# Patient Record
Sex: FEMALE | Race: BLACK OR AFRICAN AMERICAN | Employment: FULL TIME | URBAN - METROPOLITAN AREA
[De-identification: names, ages, dates, MRNs, and addresses within clinical notes are randomized per-mention and may not be internally consistent; named-entity substitution may affect disease eponyms.]

---

## 2020-10-19 ENCOUNTER — HOSPITAL ENCOUNTER (OUTPATIENT)
Dept: PHYSICAL THERAPY | Age: 60
Discharge: HOME | End: 2020-10-19
Payer: COMMERCIAL

## 2020-10-19 PROCEDURE — 97110 THERAPEUTIC EXERCISES: CPT

## 2020-10-19 PROCEDURE — 97162 PT EVAL MOD COMPLEX 30 MIN: CPT

## 2020-10-19 NOTE — PROGRESS NOTES
[facility] PHYSICAL THERAPY    [address] [place] [address] [address],[facility], 70 [facility]       Phone: [phone]  Fax: [phone] / 3[facility]  Patient Name: Lisa Braxton : 1960   Medical   Diagnosis: R hip pain Treatment Diagnosis: Right hip pain [M25.551]   Onset Date: Chronic     Referral Source: Shawn Linda MD Start of Care [facility]): 10/19/2020   Prior Hospitalization: See medical history Provider #: [id number]   Prior Level of Function: Chronic history of difficulty with prolonged walking, standing activities   Comorbidities: Arthritis, HTN, Asthma, Hx of Breast cancer (2018)   Medications: Verified on Patient Summary List   The Plan of Care and following information is based on the information from the initial evaluation.   ===========================================================================================  Assessment / key information:  Patient is a 63year old female presenting to therapy with signs and symptoms consistent with R hip pain. Patient states her symptoms began several months ago and progressively worsened. Patient states she recently was seen by an ortho MD and received x-rays on her R hip which was positive for arthritis. Patient has also been experiencing L sided lower back and leg pain which she thinks may be related to the way she is walking. Patient reports increased difficulty with prolonged walking, standing, bending and lifting activities. Patient notes symptoms feel better with rest. Patient rates pain at worst 8/10 and 7/10 at best.   Objective Data: Inspection-Patient ambulates with reduced stance time on the R, trunk lean to the R, forward trunk posture. Hip AROM: flex R 75 (P!), L 85 . Strength Testing: Hip flex R 4-/5, L 4/5; ext R 3+/5, L 3+/5; abd R 3+/5, L 4/5; Knee Flex R 4/5, L 5/5; ext R 4/5, L 5/5.  Flexibility Testing: moderate restriction to R piriformis. Tenderness to R RF, R TFL, R glute med. FOTO: unavailable at this time. Patient educated on diagnosis, prognosis, POC and HEP. Patient issued copy of HEP and denied additional questions. Patient will benefit from skilled PT in order to address these impairments and functional limitations.   ===========================================================================================  Eval Complexity: History HIGH Complexity :3+ comorbidities / personal factors will impact the outcome/ POC ;  Examination  HIGH Complexity : 4+ Standardized tests and measures addressing body structure, function, activity limitation and / or participation in recreation ; Presentation MEDIUM Complexity : Evolving with changing characteristics ; Decision Making Other outcome measures clinical judgement  MEDIUM; Overall Complexity MEDIUM  Problem List: pain affecting function, decrease ROM, decrease strength, impaired gait/ balance, decrease ADL/ functional abilitiies, decrease activity tolerance, decrease flexibility/ joint mobility and decrease transfer abilities   Treatment Plan may include any combination of the following: Therapeutic exercise, Therapeutic activities, Neuromuscular re-education, Physical agent/modality, Gait/balance training, Manual therapy, Patient education and Functional mobility training  Patient / Family readiness to learn indicated by: asking questions, trying to perform skills and interest  Persons(s) to be included in education: patient (P)  Barriers to Learning/Limitations: no  Measures taken:    Patient Goal (s): Pain reilef   Patient self reported health status: good  Rehabilitation Potential: good   Short Term Goals: To be accomplished in  3  weeks:  1. Patient will demonstrate independence with HEP for self management of symptoms. 2. Patient will report reduction in pain at worst to 4-5/10 in order to improve tolerance to standing activities.  Long Term Goals:  To be accomplished in  6 weeks: 1. Patient will improve FOTO by >/= 10 points in order to improve quality of life. 2. Patient will report reduction in pain at worst to 2-3/10 in order to improve tolerance to walking activities. 3. Patient will improve R hip flexion AROM to 90 degrees in order to improve tolerance to gardening activities. Frequency / Duration:   Patient to be seen  2  times per week for 6  weeks:  Patient / Caregiver education and instruction: self care, activity modification and exercises  G-Codes (GP): chandler  Therapist Signature: Arina Ortiz PT Date: 08/96/1155   Certification Period: na Time: 4:32 PM   ===========================================================================================  I certify that the above Physical Therapy Services are being furnished while the patient is under my care. I agree with the treatment plan and certify that this therapy is necessary. Physician Signature:        Date:       Time:     Please sign and return to [facility] or you may fax the signed copy to [phone]. Thank you.

## 2020-10-19 NOTE — PROGRESS NOTES
PHYSICAL THERAPY - DAILY TREATMENT NOTE    Patient Name: Arleen Bess        Date: 10/19/2020  : 1960   yes Patient  Verified  Visit #:     Insurance: Payor: Maria Victoria Seymour / Plan: [address] [place] HMO / Product Type: HMO /      In time: 400 Out time: 430   Total Treatment Time: 30     Medicare/BCBS Time Tracking (below)   Total Timed Codes (min):  na 1:1 Treatment Time:  na     TREATMENT AREA =  Right hip pain [M25.551]    SUBJECTIVE  Pain Level (on 0 to 10 scale):  7  / 10   Medication Changes/New allergies or changes in medical history, any new surgeries or procedures?    no  If yes, update Summary List   Subjective Functional Status/Changes:  []  No changes reported     See POC          OBJECTIVE    10 min Therapeutic Exercise:  [x]  See flow sheet   Rationale:      increase ROM and increase strength to improve the patients ability to perform walking activities. Billed With/As:   [x] TE   [] TA   [] Neuro   [] Self Care Patient Education: [x] Review HEP    [] Progressed/Changed HEP based on:   [] positioning   [] body mechanics   [] transfers   [] heat/ice application    [] other:      Other Objective/Functional Measures:    See POC     Post Treatment Pain Level (on 0 to 10) scale:    10     ASSESSMENT  Assessment/Changes in Function:     See POC     []  See Progress Note/Recertification   Patient will continue to benefit from skilled PT services to modify and progress therapeutic interventions, address functional mobility deficits, address ROM deficits, address strength deficits, analyze and address soft tissue restrictions, analyze and cue movement patterns, analyze and modify body mechanics/ergonomics and assess and modify postural abnormalities to attain remaining goals.    Progress toward goals / Updated goals:    See POC     PLAN  [x]  Upgrade activities as tolerated yes Continue plan of care   []  Discharge due to :    []  Other:      Therapist: Gema Fernández, PT    Date: 10/19/2020 Time: 4:32 PM     Future Appointments   Date Time Provider Masha Acunai   10/28/2020  5:45 PM Jackalyn Pata [facility] [address]   10/30/2020  9:30 AM Jackalyn Pata [facility] [address]   11/2/2020  7:00 AM Ivory Mariee, PT [facility] [address]   11/9/2020  7:00 AM Ivory Mariee, PT [facility] [address]   11/16/2020  7:00 AM Vikki Jain, PT [id number]

## 2020-10-28 ENCOUNTER — APPOINTMENT (OUTPATIENT)
Dept: PHYSICAL THERAPY | Age: 60
End: 2020-10-28
Payer: COMMERCIAL

## 2020-10-30 ENCOUNTER — APPOINTMENT (OUTPATIENT)
Dept: PHYSICAL THERAPY | Age: 60
End: 2020-10-30
Payer: COMMERCIAL

## 2020-11-02 ENCOUNTER — HOSPITAL ENCOUNTER (OUTPATIENT)
Dept: PHYSICAL THERAPY | Age: 60
Discharge: HOME | End: 2020-11-02
Payer: COMMERCIAL

## 2020-11-02 PROCEDURE — 97140 MANUAL THERAPY 1/> REGIONS: CPT

## 2020-11-02 PROCEDURE — 97110 THERAPEUTIC EXERCISES: CPT

## 2020-11-02 NOTE — PROGRESS NOTES
PHYSICAL THERAPY - DAILY TREATMENT NOTE    Patient Name: Madhav Dudley        Date: 2020  : 1960   yes Patient  Verified  Visit #:     Insurance: Payor: Melva Low / Plan: [address] [place] HMO / Product Type: HMO /      In time: 452 Out time: 128   Total Treatment Time: 37     Medicare/BCBS Time Tracking (below)   Total Timed Codes (min):  na 1:1 Treatment Time:  na     TREATMENT AREA =  Right hip pain [M25.551]    SUBJECTIVE  Pain Level (on 0 to 10 scale):    / 10   Medication Changes/New allergies or changes in medical history, any new surgeries or procedures?    no  If yes, update Summary List   Subjective Functional Status/Changes:  _  No changes reported     Patient reports the exercises at home seem to be helping as she noticed less pain and improved hip ROM. OBJECTIVE    25 min Therapeutic Exercise:  [x]  See flow sheet   Rationale:      increase ROM and increase strength to improve the patients ability to perform walking activities. 10 min Manual Therapy: STM to R RF, R TFL, R glute med; R hip flexion PROM   Rationale:      decrease pain, increase ROM, increase tissue extensibility and decrease trigger points to improve patient's ability to perform standing activities. The manual therapy interventions were performed at a separate and distinct time from the therapeutic activities interventions.     Billed With/As:   [x] TE   [] TA   [] Neuro   [] Self Care Patient Education: [x] Review HEP    [] Progressed/Changed HEP based on:   [] positioning   [] body mechanics   [] transfers   [] heat/ice application    [] other:      Other Objective/Functional Measures:    Progressed therapy program per flowsheet in order to improve R hip ROM, strength and stability  Tenderness noted to R RF during MT treatment      Post Treatment Pain Level (on 0 to 10) scale:   5  / 10     ASSESSMENT  Assessment/Changes in Function:     Patient noted a slight increase in soreness at the end of her treatment session which she related to the exercises. Patient advised on likely increase in soreness related to initiation of new exercise program and how to respond appropriately. []  See Progress Note/Recertification   Patient will continue to benefit from skilled PT services to modify and progress therapeutic interventions, address functional mobility deficits, address ROM deficits, address strength deficits, analyze and address soft tissue restrictions, analyze and cue movement patterns, analyze and modify body mechanics/ergonomics and assess and modify postural abnormalities to attain remaining goals.    Progress toward goals / Updated goals:    Progressing with STG#1-demonstrates good understanding of HEP     PLAN  [x]  Upgrade activities as tolerated yes Continue plan of care   []  Discharge due to :    []  Other:      Therapist: Kassidy Andres PT    Date: 11/2/2020 Time: 7:41 AM     Future Appointments   Date Time Provider Masha Gu   11/9/2020  7:00 AM [facility]   11/16/2020  7:00 AM CARLOS Dove 1956

## 2020-11-09 ENCOUNTER — HOSPITAL ENCOUNTER (OUTPATIENT)
Dept: PHYSICAL THERAPY | Age: 60
Discharge: HOME | End: 2020-11-09
Payer: COMMERCIAL

## 2020-11-09 PROCEDURE — 97140 MANUAL THERAPY 1/> REGIONS: CPT

## 2020-11-09 PROCEDURE — 97110 THERAPEUTIC EXERCISES: CPT

## 2020-11-09 NOTE — PROGRESS NOTES
PHYSICAL THERAPY - DAILY TREATMENT NOTE    Patient Name: Heather Gonzales        Date: 2020  : 1960   yes Patient  Verified  Visit #:   3   of   12  Insurance: Payor: Anahi Cortez / Plan: [address] [place] HMO / Product Type: HMO /      In time: 551 Out time: 977   Total Treatment Time: 55     Medicare/BCBS Time Tracking (below)   Total Timed Codes (min):  na 1:1 Treatment Time:  na     TREATMENT AREA =  Right hip pain [M25.551]    SUBJECTIVE  Pain Level (on 0 to 10 scale):  5  / 10   Medication Changes/New allergies or changes in medical history, any new surgeries or procedures?    no  If yes, update Summary List   Subjective Functional Status/Changes:  []  No changes reported     Patient reports her R hip is sore and stiff this morning, but she does feel it is slowly improving. OBJECTIVE  Modalities Rationale:     decrease pain and increase tissue extensibility to improve patient's ability to perform transfers. min [] Estim, type/location:                                      []  att     []  unatt     []  w/US     []  w/ice    []  w/heat    min []  Mechanical Traction: type/lbs                   []  pro   []  sup   []  int   []  cont    []  before manual    []  after manual    min []  Ultrasound, settings/location:      min []  Iontophoresis w/ dexamethasone, location:                                               []  take home patch       []  in clinic   10 min []  Ice     [x]  Heat    location/position: To lumbar spine in supine with bolster    min []  Vasopneumatic Device, press/temp:     min []  Other:    [x] Skin assessment post-treatment (if applicable):    [x]  intact    []  redness- no adverse reaction     []redness  adverse reaction:        30 min Therapeutic Exercise:  [x]  See flow sheet   Rationale:      increase ROM and increase strength to improve the patients ability to perform walking activities.       15 min Manual Therapy: STM to R RF, R TFL; R hip flexion and ER PROM Rationale:      decrease pain, increase ROM, increase tissue extensibility and decrease trigger points to improve patient's ability to perform standing activities. The manual therapy interventions were performed at a separate and distinct time from the therapeutic activities interventions. Billed With/As:   [x] TE   [] TA   [] Neuro   [] Self Care Patient Education: [x] Review HEP    [] Progressed/Changed HEP based on:   [] positioning   [] body mechanics   [] transfers   [] heat/ice application    [] other:      Other Objective/Functional Measures: Added prone quad stretch to today's session for improved quadriceps flexibility  FOTO 46/100  Patient reporting increased L LE pain after step ups     Post Treatment Pain Level (on 0 to 10) scale:   4  / 10     ASSESSMENT  Assessment/Changes in Function:     Patient noted improved R hip symptoms post session, however noted L LE radicular symptoms post session. Plan to add additional core and pelvic exercises to address R hip symptoms as well as general radicular symptoms. []  See Progress Note/Recertification   Patient will continue to benefit from skilled PT services to modify and progress therapeutic interventions, address functional mobility deficits, address ROM deficits, address strength deficits, analyze and address soft tissue restrictions, analyze and cue movement patterns, analyze and modify body mechanics/ergonomics and assess and modify postural abnormalities to attain remaining goals.    Progress toward goals / Updated goals:    Progressing with STG#2     PLAN  [x]  Upgrade activities as tolerated yes Continue plan of care   []  Discharge due to :    []  Other:      Therapist: Lindsay Darby, PT    Date: 11/9/2020 Time: 7:57 AM     Future Appointments   Date Time Provider Masha Gu   11/16/2020  7:00 AM Emigdio Siddiqui, PT [id number]

## 2020-11-16 ENCOUNTER — HOSPITAL ENCOUNTER (OUTPATIENT)
Dept: PHYSICAL THERAPY | Age: 60
Discharge: HOME | End: 2020-11-16
Payer: COMMERCIAL

## 2020-11-16 PROCEDURE — 97140 MANUAL THERAPY 1/> REGIONS: CPT

## 2020-11-16 PROCEDURE — 97110 THERAPEUTIC EXERCISES: CPT

## 2020-11-16 NOTE — PROGRESS NOTES
PHYSICAL THERAPY - DAILY TREATMENT NOTE    Patient Name: Heather Gonzales        Date: 2020  : 1960   yes Patient  Verified  Visit #:      12  Insurance: Payor: Anahi Cortez / Plan: Rosy [employer] HMO / Product Type: HMO /      In time: 700 Out time: 263   Total Treatment Time: 56     Medicare/BCBS Time Tracking (below)   Total Timed Codes (min):  na 1:1 Treatment Time:  na     TREATMENT AREA =  Right hip pain [M25.551]    SUBJECTIVE  Pain Level (on 0 to 10 scale):  5  / 10   Medication Changes/New allergies or changes in medical history, any new surgeries or procedures?    no  If yes, update Summary List   Subjective Functional Status/Changes:  []  No changes reported     Patient reports an approximate 60% improvement in symptom since the start of therapy. Patient rates her pain at worst over the past 1-2 weeks as a 7/10. Patient notes she is no longer using a crutch to ambulate. OBJECTIVE  Modalities Rationale:     decrease pain and increase tissue extensibility to improve patient's ability to perform transfers. min [] Estim, type/location:                                      []  att     []  unatt     []  w/US     []  w/ice    []  w/heat    min []  Mechanical Traction: type/lbs                   []  pro   []  sup   []  int   []  cont    []  before manual    []  after manual    min []  Ultrasound, settings/location:      min []  Iontophoresis w/ dexamethasone, location:                                               []  take home patch       []  in clinic   10 min []  Ice     [x]  Heat    location/position:  To lumbar spine in supine with bolster    min []  Vasopneumatic Device, press/temp:     min []  Other:    [x] Skin assessment post-treatment (if applicable):    [x]  intact    []  redness- no adverse reaction     []redness  adverse reaction:        34 min Therapeutic Exercise:  [x]  See flow sheet   Rationale:      increase ROM and increase strength to improve the patients ability to perform walking activities. 12 min Manual Therapy: STM to R glute med, R piriformis, R prone quad stretch all in prone   Rationale:      decrease pain, increase ROM, increase tissue extensibility and decrease trigger points to improve patient's ability to perform standing activities. The manual therapy interventions were performed at a separate and distinct time from the therapeutic activities interventions. Billed With/As:   [x] TE   [] TA   [] Neuro   [] Self Care Patient Education: [x] Review HEP    [] Progressed/Changed HEP based on:   [] positioning   [] body mechanics   [] transfers   [] heat/ice application    [] other:      Other Objective/Functional Measures:    See PN     Post Treatment Pain Level (on 0 to 10) scale:   5  / 10     ASSESSMENT  Assessment/Changes in Function:     See PN     []  See Progress Note/Recertification   Patient will continue to benefit from skilled PT services to modify and progress therapeutic interventions, address functional mobility deficits, address ROM deficits, address strength deficits, analyze and address soft tissue restrictions, analyze and cue movement patterns, analyze and modify body mechanics/ergonomics and assess and modify postural abnormalities to attain remaining goals.    Progress toward goals / Updated goals:    See PN     PLAN  [x]  Upgrade activities as tolerated yes Continue plan of care   []  Discharge due to :    []  Other:      Therapist: Indigo Rai PT    Date: 11/16/2020 Time: 8:16 AM     Future Appointments   Date Time Provider Masha Gu   11/30/2020  1:00 PM Ramin Han PT [facility]   12/8/2020 12:00 PM Ramin Han PT [facility]   12/14/2020  8:00 AM CARLOS [address]

## 2020-11-16 NOTE — PROGRESS NOTES
[address] [place] [address] THERAPY  [address] [facility], [facility] 201,[facility], 70 [facility] - Phone: [phone]  Fax: [phone]  PROGRESS NOTE  Patient Name: Lisa Braxton : 1960   Treatment/Medical Diagnosis: Right hip pain [M25.551]   Referral Source: Shawn Linda MD     Date of Initial Visit: 10/19/20 Attended Visits: 4 Missed Visits: 0     SUMMARY OF TREATMENT  Patient has attended 3 follow up visits since her initial evaluation on 10/19/20. Patient has received therapeutic exercise, manual therapy and modalities in order to improve R hip ROM, mobility, flexibility, strength, stability and pain reduction. CURRENT STATUS  Patient reports a 60% improvement in symptoms since the start of PT. Patient states she has noticed an improved ability to walk without the use of her [facility] as well as improved endurance to walking and standing activities. Patient rates her pain at worst as a 7/10 over the past 1-2 weeks. Objectively, patient's R hip flexion has improved to 85 degrees with less pain during movement. Patient continues to present with limitations with IR and ER AROM with pain reported into R groin during movement. Patient would benefit from continued PT in order to further improve upon deficits and functional limitations. Goal/Measure of Progress Goal Met? 1. Patient will improve FOTO by >/= 10 points in order to improve quality of life. Status at last Eval: n/a Current Status: 46/100 progressing   2. Patient will report reduction in pain at worst to 2-3/10 in order to improve tolerance to walking activities. Status at last Eval: 8/10 Current Status: 7/10 progressing   3. Patient will improve R hip flexion AROM to 90 degrees in order to improve tolerance to gardening activities   Status at last Eval: 75 degrees (P!) Current Status: 86 degrees progressing     New Goals to be achieved in __4__  weeks:  1. Continue with LTG#1  2. Continue with LTG#2  3. Continue with LTG#3  RECOMMENDATIONS  Patient would benefit from continued PT 1-2x/week for 4 weeks in order to address remaining impairments and functional limitations. If you have any questions/comments please contact us directly at [phone]. Thank you for allowing us to assist in the care of your patient. Therapist Signature: Elizabeth Thakkar, PT Date: 11/16/2020     Time: 7:29 AM   NOTE TO PHYSICIAN:  PLEASE COMPLETE THE ORDERS BELOW AND FAX TO   [facility]: [phone]  If you are unable to process this request in 24 hours please contact our office: [phone]    ___ I have read the above report and request that my patient continue as recommended.   ___ I have read the above report and request that my patient continue therapy with the following changes/special instructions:_________________________________________________________   ___ I have read the above report and request that my patient be discharged from therapy.      Physician Signature:        Date:       Time:

## 2020-11-18 ENCOUNTER — APPOINTMENT (OUTPATIENT)
Dept: PHYSICAL THERAPY | Age: 60
End: 2020-11-18
Payer: COMMERCIAL

## 2020-11-30 ENCOUNTER — APPOINTMENT (OUTPATIENT)
Dept: PHYSICAL THERAPY | Age: 60
End: 2020-11-30
Payer: COMMERCIAL

## 2020-12-08 ENCOUNTER — APPOINTMENT (OUTPATIENT)
Dept: PHYSICAL THERAPY | Age: 60
End: 2020-12-08

## 2020-12-14 ENCOUNTER — APPOINTMENT (OUTPATIENT)
Dept: PHYSICAL THERAPY | Age: 60
End: 2020-12-14

## 2021-04-13 NOTE — PROGRESS NOTES
[address] [facility] PHYSICAL THERAPY   [facility], 45 [facility], [address] [place] [address] - Phone: [phone]  Fax: [phone]  DISCHARGE SUMMARY  Patient Name: Yovani Vann : 1960   Treatment/Medical Diagnosis: Right hip pain [M25.551]   Referral Source: Greta Chun MD     Date of Initial Visit: 10/19/20 Attended Visits: 4 Missed Visits: 1     SUMMARY OF TREATMENT  Patient has attended 3 follow up visits since her initial evaluation on 10/19/20. Patient has received therapeutic exercise, manual therapy and modalities in order to improve R hip ROM, mobility, flexibility, strength, stability and pain reduction. CURRENT STATUS  Patient was last seen on 20 where she reported a 60% improvement in symptoms since starting therapy. Patient was recommended to continue with PT 1-2x/week for 4 weeks, however did not return after this date. At this time, patient will be discharged from PT, thank you for this referral.     Goal/Measure of Progress Goal Met? 1. Patient will improve FOTO by >/= 10 points in order to improve quality of life. Status at last Eval: n/a Current Status: 46/100 progressing   2. Patient will report reduction in pain at worst to 2-3/10 in order to improve tolerance to walking activities. Status at last Eval: 810 Current Status: 7/10 progressing   3. Patient will improve R hip flexion AROM to 90 degrees in order to improve tolerance to gardening activities   Status at last Eval: 75 degrees (P!) Current Status: 86 degrees progressing     RECOMMENDATIONS  Discontinue therapy due to lack of attendance or compliance. If you have any questions/comments please contact us directly at [phone]. Thank you for allowing us to assist in the care of your patient. Therapist Signature:  Kurtis Aguila PT Date: 21     Time: 2:44 PM